# Patient Record
Sex: FEMALE | Race: WHITE | NOT HISPANIC OR LATINO | Employment: STUDENT | URBAN - NONMETROPOLITAN AREA
[De-identification: names, ages, dates, MRNs, and addresses within clinical notes are randomized per-mention and may not be internally consistent; named-entity substitution may affect disease eponyms.]

---
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
## 2024-02-08 ENCOUNTER — OPTICAL OFFICE (OUTPATIENT)
Dept: URBAN - NONMETROPOLITAN AREA CLINIC 4 | Facility: CLINIC | Age: 19
Setting detail: OPHTHALMOLOGY
End: 2024-02-08
Payer: COMMERCIAL

## 2024-02-08 ENCOUNTER — DOCTOR'S OFFICE (OUTPATIENT)
Dept: URBAN - NONMETROPOLITAN AREA CLINIC 1 | Facility: CLINIC | Age: 19
Setting detail: OPHTHALMOLOGY
End: 2024-02-08
Payer: COMMERCIAL

## 2024-02-08 DIAGNOSIS — H52.13: ICD-10-CM

## 2024-02-08 DIAGNOSIS — H52.223: ICD-10-CM

## 2024-02-08 PROCEDURE — 92014 COMPRE OPH EXAM EST PT 1/>: CPT

## 2024-02-08 PROCEDURE — V2784 LENS POLYCARB OR EQUAL: HCPCS

## 2024-02-08 PROCEDURE — 92015 DETERMINE REFRACTIVE STATE: CPT

## 2024-02-08 PROCEDURE — V2784 LENS POLYCARB OR EQUAL: HCPCS | Mod: LT

## 2024-02-08 PROCEDURE — V2020 VISION SVCS FRAMES PURCHASES: HCPCS

## 2024-02-08 PROCEDURE — V2108 SPHEROCYLINDER 4.25D/2.12-4D: HCPCS

## 2024-02-08 PROCEDURE — V2108 SPHEROCYLINDER 4.25D/2.12-4D: HCPCS | Mod: LT

## 2024-02-08 ASSESSMENT — CONFRONTATIONAL VISUAL FIELD TEST (CVF)
OD_FINDINGS: FULL
OS_FINDINGS: FULL

## 2024-02-09 ENCOUNTER — OPTICAL OFFICE (OUTPATIENT)
Dept: URBAN - NONMETROPOLITAN AREA CLINIC 4 | Facility: CLINIC | Age: 19
Setting detail: OPHTHALMOLOGY
End: 2024-02-09
Payer: COMMERCIAL

## 2024-02-09 DIAGNOSIS — H52.13: ICD-10-CM

## 2024-02-09 PROCEDURE — V2108 SPHEROCYLINDER 4.25D/2.12-4D: HCPCS | Mod: LT

## 2024-02-09 PROCEDURE — V2020 VISION SVCS FRAMES PURCHASES: HCPCS

## 2024-02-09 PROCEDURE — V2108 SPHEROCYLINDER 4.25D/2.12-4D: HCPCS

## 2024-02-09 PROCEDURE — V2784 LENS POLYCARB OR EQUAL: HCPCS | Mod: LT

## 2024-02-09 PROCEDURE — V2784 LENS POLYCARB OR EQUAL: HCPCS

## 2024-02-29 ENCOUNTER — TRANSCRIBE ORDERS (OUTPATIENT)
Dept: CARDIOLOGY CLINIC | Facility: CLINIC | Age: 19
End: 2024-02-29

## 2024-02-29 DIAGNOSIS — R30.0 DYSURIA: Primary | ICD-10-CM

## 2024-03-26 PROBLEM — R30.0 DYSURIA: Status: ACTIVE | Noted: 2024-03-26

## 2024-03-27 ENCOUNTER — OFFICE VISIT (OUTPATIENT)
Dept: UROLOGY | Facility: CLINIC | Age: 19
End: 2024-03-27
Payer: COMMERCIAL

## 2024-03-27 VITALS
BODY MASS INDEX: 43.71 KG/M2 | RESPIRATION RATE: 18 BRPM | DIASTOLIC BLOOD PRESSURE: 80 MMHG | TEMPERATURE: 98 F | OXYGEN SATURATION: 99 % | HEIGHT: 66 IN | SYSTOLIC BLOOD PRESSURE: 124 MMHG | HEART RATE: 92 BPM | WEIGHT: 272 LBS

## 2024-03-27 DIAGNOSIS — N39.41 URGE INCONTINENCE OF URINE: Primary | ICD-10-CM

## 2024-03-27 DIAGNOSIS — R30.0 DYSURIA: ICD-10-CM

## 2024-03-27 LAB
POST-VOID RESIDUAL VOLUME, ML POC: 115 ML
SL AMB  POCT GLUCOSE, UA: NORMAL
SL AMB LEUKOCYTE ESTERASE,UA: NORMAL
SL AMB POCT BILIRUBIN,UA: NORMAL
SL AMB POCT BLOOD,UA: NORMAL
SL AMB POCT CLARITY,UA: CLEAR
SL AMB POCT COLOR,UA: YELLOW
SL AMB POCT KETONES,UA: NORMAL
SL AMB POCT NITRITE,UA: NORMAL
SL AMB POCT PH,UA: 6
SL AMB POCT SPECIFIC GRAVITY,UA: >=1.03
SL AMB POCT URINE PROTEIN: NORMAL
SL AMB POCT UROBILINOGEN: 0.2

## 2024-03-27 PROCEDURE — 81003 URINALYSIS AUTO W/O SCOPE: CPT | Performed by: UROLOGY

## 2024-03-27 PROCEDURE — 99204 OFFICE O/P NEW MOD 45 MIN: CPT | Performed by: UROLOGY

## 2024-03-27 PROCEDURE — 51798 US URINE CAPACITY MEASURE: CPT | Performed by: UROLOGY

## 2024-03-27 PROCEDURE — 87086 URINE CULTURE/COLONY COUNT: CPT | Performed by: UROLOGY

## 2024-03-27 RX ORDER — GUANFACINE 1 MG/1
1 TABLET ORAL DAILY
COMMUNITY
Start: 2024-02-05

## 2024-03-27 RX ORDER — DESOGESTREL AND ETHINYL ESTRADIOL 0.15-0.03
1 KIT ORAL DAILY
COMMUNITY
Start: 2024-01-23

## 2024-03-27 RX ORDER — LAMOTRIGINE 200 MG/1
200 TABLET ORAL DAILY
COMMUNITY
Start: 2024-03-18

## 2024-03-27 RX ORDER — OMEPRAZOLE 40 MG/1
40 CAPSULE, DELAYED RELEASE ORAL DAILY
COMMUNITY
Start: 2024-03-10

## 2024-03-27 NOTE — PROGRESS NOTES
UROLOGY PROGRESS NOTE         NAME: Karley Soler  AGE: 18 y.o. SEX: female  : 2005   MRN: [id number]    DATE: 3/27/2024  TIME: 2:06 PM    Assessment and Plan      Impression:   1. Dysuria  -     Ambulatory referral to Urology  -      kidney and bladder; Future; Expected date: 2024  -     POCT urine dip auto non-scope  -     POCT Measure PVR    2. Urge incontinence of urine  -     Ambulatory Referral to Physical Therapy; Future         Plan: Urinalysis was completely normal today.  Her postvoid residual was borderline at 110 cc.  She was also trying to pee and the cup for specimen.  In addition, she notes intermittent problems with urge related incontinence.  She does have problems with constipation and we discussed addressing that issue which will likely help her urinary symptoms.  Will check renal and bladder sonogram.  Await urine culture which was to be sent today.  May have some mild OAB type symptoms regarding frequency and urgency.  If urine culture negative would trial a mild overactive bladder medication.  Also discussed staying hydrated and avoiding irritative foods and drinks such as tea and coffee.  If culture positive treat appropriately.  Consider further evaluation with cystoscopy if persistent symptoms.  I referred her for physical therapy barring any issues with the renal and bladder ultrasound.  Will see her back following physical therapy.      Chief Complaint     Chief Complaint   Patient presents with   • New Patient Visit     Having frequency that has been going on for a year.      History of Present Illness     HPI: Karley Soler is a 18 y.o. year old female who presents with dysuria.  Patient's actually has some occasional urgency and frequency.  She has some feelings of incomplete bladder emptying at times.  She had a urine culture done earlier this year which apparently was negative at the time of symptoms.  Her urinalysis at the time however did suggest a UTI  "with pyuria noted.  She has multiple medical problems including constipation and diabetes              The following portions of the patient's history were reviewed and updated as appropriate: allergies, current medications, past family history, past medical history, past social history, past surgical history and problem list.  Past Medical History:   Diagnosis Date   • ADHD (attention deficit hyperactivity disorder)    • Anxiety    • Autism    • Bipolar 1 disorder (HCC)    • Constipation    • Depression    • Diabetes mellitus (HCC)    • Inflammatory bowel disease    • Obesity    • Seasonal allergies    • Sinus infection      Past Surgical History:   Procedure Laterality Date   • ESOPHAGOGASTRODUODENOSCOPY     • NASAL FRACTURE SURGERY       shoulder  Review of Systems     Const: Denies chills, fever and weight loss.  CV: Denies chest pain.  Resp: Denies SOB.  GI: Denies abdominal pain, nausea and vomiting.  : Denies symptoms other than stated above.  Musculo: Denies back pain.    Objective   /80   Pulse 92   Temp 98 °F (36.7 °C) (Tympanic)   Resp 18   Ht 5' 5.5\" (1.664 m)   Wt 123 kg (272 lb)   SpO2 99%   BMI 44.57 kg/m²     Physical Exam  Const: Appears healthy and well developed. No signs of acute distress present.  Resp: Respirations are regular and unlabored.   CV: Rate is regular. Rhythm is regular.  Abdomen: Abdomen is soft, nontender, and nondistended. Kidneys are not palpable.  : Bladder not percussible or tender.  Postvoid residual done today.  Psych: Patient's attitude is cooperative. Mood is normal. Affect is normal.    Procedure   Procedures     Current Medications     Current Outpatient Medications:   •  Apri 0.15-30 MG-MCG per tablet, Take 1 tablet by mouth daily, Disp: , Rfl:   •  cetirizine (ZyrTEC) 10 mg tablet, Take 10 mg by mouth, Disp: , Rfl:   •  cloNIDine (CATAPRES) 0.1 mg tablet, Take 0.1 mg by mouth once, Disp: , Rfl:   •  guanFACINE (TENEX) 1 mg tablet, Take 1 mg by mouth " daily, Disp: , Rfl:   •  lamoTRIgine (LaMICtal) 200 MG tablet, Take 200 mg by mouth daily, Disp: , Rfl:   •  omeprazole (PriLOSEC) 40 MG capsule, Take 40 mg by mouth daily, Disp: , Rfl:   •  ondansetron (ZOFRAN-ODT) 8 mg disintegrating tablet, Take one tablet allow to dissolve on tongue up to twice daily as needed for nausea/vomiting, Disp: , Rfl:   •  polyethylene glycol (GLYCOLAX) powder, TAKE 17 G BY MOUTH DAILY. MIX WITH WATER., Disp: , Rfl: 11  •  fluticasone (FLONASE) 50 mcg/act nasal spray, 1 spray into each nostril daily for 30 days, Disp: 1 Bottle, Rfl: 0        Min Zhao MD

## 2024-03-29 LAB — BACTERIA UR CULT: NORMAL

## 2024-04-10 ENCOUNTER — TELEPHONE (OUTPATIENT)
Dept: UROLOGY | Facility: CLINIC | Age: 19
End: 2024-04-10

## 2024-04-16 ENCOUNTER — HOSPITAL ENCOUNTER (OUTPATIENT)
Dept: ULTRASOUND IMAGING | Facility: HOSPITAL | Age: 19
Discharge: HOME | End: 2024-04-16
Attending: UROLOGY
Payer: COMMERCIAL

## 2024-04-16 DIAGNOSIS — R30.0 DYSURIA: ICD-10-CM

## 2024-04-16 PROCEDURE — 76770 US EXAM ABDO BACK WALL COMP: CPT

## 2024-08-07 ENCOUNTER — OFFICE VISIT (OUTPATIENT)
Dept: URGENT CARE | Facility: CLINIC | Age: 19
End: 2024-08-07
Payer: COMMERCIAL

## 2024-08-07 ENCOUNTER — APPOINTMENT (OUTPATIENT)
Dept: RADIOLOGY | Facility: CLINIC | Age: 19
End: 2024-08-07
Payer: COMMERCIAL

## 2024-08-07 VITALS
BODY MASS INDEX: 45 KG/M2 | DIASTOLIC BLOOD PRESSURE: 88 MMHG | TEMPERATURE: 98.8 F | HEIGHT: 66 IN | SYSTOLIC BLOOD PRESSURE: 114 MMHG | OXYGEN SATURATION: 99 % | WEIGHT: 280 LBS | HEART RATE: 90 BPM

## 2024-08-07 DIAGNOSIS — S99.922A INJURY OF LEFT FOOT, INITIAL ENCOUNTER: ICD-10-CM

## 2024-08-07 DIAGNOSIS — S90.222A CONTUSION OF LESSER TOE OF LEFT FOOT WITH DAMAGE TO NAIL, INITIAL ENCOUNTER: ICD-10-CM

## 2024-08-07 DIAGNOSIS — S99.922A INJURY OF LEFT FOOT, INITIAL ENCOUNTER: Primary | ICD-10-CM

## 2024-08-07 PROCEDURE — S9088 SERVICES PROVIDED IN URGENT: HCPCS | Performed by: PHYSICIAN ASSISTANT

## 2024-08-07 PROCEDURE — 73630 X-RAY EXAM OF FOOT: CPT

## 2024-08-07 PROCEDURE — 99213 OFFICE O/P EST LOW 20 MIN: CPT | Performed by: PHYSICIAN ASSISTANT

## 2024-08-07 RX ORDER — MELOXICAM 15 MG/1
15 TABLET ORAL DAILY
Qty: 10 TABLET | Refills: 0 | Status: SHIPPED | OUTPATIENT
Start: 2024-08-07 | End: 2024-08-17

## 2024-08-07 RX ORDER — TRAZODONE HYDROCHLORIDE 50 MG/1
TABLET ORAL
COMMUNITY
Start: 2024-07-08

## 2024-08-07 NOTE — PROGRESS NOTES
[facility]        NAME: Karley Soler is a 18 y.o. female  : 2005    MRN: [id number]  DATE: 2024  TIME: 2:33 PM    Assessment and Plan   Injury of left foot, initial encounter [S99.922A]  1. Injury of left foot, initial encounter  XR foot 3+ vw left      2. Contusion of lesser toe of left foot with damage to nail, initial encounter  meloxicam (Mobic) 15 mg tablet            Patient Instructions   There are no Patient Instructions on file for this visit.      Follow up with PCP in 3-5 days.  Proceed to  ER if symptoms worsen.    Chief Complaint     Chief Complaint   Patient presents with    left foot pain     Pt states that she has left foot pain x7 days due to dropping cell phone on top of foot. Pt taking OTC mediations without relief.         History of Present Illness       The patient presents to clinic for injury to her left foot.  She states that the injury happened approximately 1 week ago.  She states that she dropped a phone on her foot 1 week ago.  She is complaining of pain on the lateral aspect of the left fifth toe.  She is taking ibuprofen which does not seem to be helping with her symptoms.        Review of Systems   Review of Systems   Skin:  Negative for color change, pallor, rash and wound.   Neurological:  Negative for dizziness.         Current Medications       Current Outpatient Medications:     Apri 0.15-30 MG-MCG per tablet, Take 1 tablet by mouth daily, Disp: , Rfl:     cetirizine (ZyrTEC) 10 mg tablet, Take 10 mg by mouth, Disp: , Rfl:     meloxicam (Mobic) 15 mg tablet, Take 1 tablet (15 mg total) by mouth daily for 10 days, Disp: 10 tablet, Rfl: 0    omeprazole (PriLOSEC) 40 MG capsule, Take 40 mg by mouth daily, Disp: , Rfl:     ondansetron (ZOFRAN-ODT) 8 mg disintegrating tablet, Take one tablet allow to dissolve on tongue up to twice daily as needed for nausea/vomiting, Disp: , Rfl:     traZODone (DESYREL) 50 mg tablet, TAKE 1-2 TABLETS BY MOUTH AT  "BEDTIME AS NEEDED FOR SLEEP, Disp: , Rfl:     fluticasone (FLONASE) 50 mcg/act nasal spray, 1 spray into each nostril daily for 30 days, Disp: 1 Bottle, Rfl: 0    Current Allergies     Allergies as of 08/07/2024 - Reviewed 08/07/2024   Allergen Reaction Noted    Duloxetine hcl Other (See Comments) 03/20/2020    Sertraline Other (See Comments) 03/20/2020    Lactose intolerance (gi) - food allergy Diarrhea 03/12/2016    Fluoxetine Other (See Comments) 05/15/2020    Gluten meal - food allergy  01/31/2018            The following portions of the patient's history were reviewed and updated as appropriate: allergies, current medications, past family history, past medical history, past social history, past surgical history and problem list.     Past Medical History:   Diagnosis Date    ADHD (attention deficit hyperactivity disorder)     Anxiety     Autism     Bipolar 1 disorder (HCC)     Constipation     Depression     Diabetes mellitus (HCC)     Inflammatory bowel disease     Obesity     Seasonal allergies     Sinus infection        Past Surgical History:   Procedure Laterality Date    ESOPHAGOGASTRODUODENOSCOPY      NASAL FRACTURE SURGERY         Family History   Problem Relation Age of Onset    Anxiety disorder Mother     No Known Problems Father          Medications have been verified.        Objective   /88   Pulse 90   Temp 98.8 °F (37.1 °C)   Ht 5' 5.5\" (1.664 m)   Wt 127 kg (280 lb)   SpO2 99%   BMI 45.89 kg/m²        Physical Exam     Physical Exam  Musculoskeletal:        Feet:       Comments: The patient has tenderness to palpation noted in the left lateral fifth metatarsal bone.  There is no significant ecchymosis or edema.         XR FOOT 3+ VW LEFT     INDICATION: S99.922A: Unspecified injury of left foot, initial encounter.     COMPARISON: 5/23/2015     FINDINGS:     No acute fracture or dislocation.     No significant degenerative changes.     No lytic or blastic osseous lesion.     Unremarkable " soft tissues.     IMPRESSION:     No acute osseous abnormality.        I reviewed the x-ray with the patient.  There is no acute fracture.  She was given a DME surgical shoe for comfort.  I  if her Mobic for her pain relief.  I suggest follow-up with PCP or go to the ER if symptoms worsen

## 2024-09-04 ENCOUNTER — OFFICE VISIT (OUTPATIENT)
Dept: URGENT CARE | Facility: CLINIC | Age: 19
End: 2024-09-04
Payer: COMMERCIAL

## 2024-09-04 VITALS
WEIGHT: 280 LBS | HEIGHT: 65 IN | HEART RATE: 95 BPM | RESPIRATION RATE: 18 BRPM | DIASTOLIC BLOOD PRESSURE: 78 MMHG | OXYGEN SATURATION: 98 % | TEMPERATURE: 98.2 F | BODY MASS INDEX: 46.65 KG/M2 | SYSTOLIC BLOOD PRESSURE: 106 MMHG

## 2024-09-04 DIAGNOSIS — T78.40XA ALLERGIC REACTION, INITIAL ENCOUNTER: Primary | ICD-10-CM

## 2024-09-04 PROCEDURE — 99213 OFFICE O/P EST LOW 20 MIN: CPT

## 2024-09-04 PROCEDURE — S9088 SERVICES PROVIDED IN URGENT: HCPCS

## 2024-09-04 RX ORDER — DEXTROMETHORPHAN HYDROBROMIDE, BUPROPION HYDROCHLORIDE 105; 45 MG/1; MG/1
TABLET, MULTILAYER, EXTENDED RELEASE ORAL
COMMUNITY
Start: 2024-08-22

## 2024-09-04 RX ORDER — LANOLIN ALCOHOL/MO/W.PET/CERES
400 CREAM (GRAM) TOPICAL DAILY
COMMUNITY
Start: 2024-08-07

## 2024-09-04 RX ORDER — TRIAMCINOLONE ACETONIDE 1 MG/G
CREAM TOPICAL 2 TIMES DAILY
Qty: 45 G | Refills: 1 | Status: SHIPPED | OUTPATIENT
Start: 2024-09-04

## 2024-09-04 RX ORDER — PREDNISONE 10 MG/1
TABLET ORAL
Qty: 42 TABLET | Refills: 0 | Status: SHIPPED | OUTPATIENT
Start: 2024-09-04

## 2024-09-04 NOTE — PATIENT INSTRUCTIONS
Take prednisone as prescribed - follow taper instructions  Use Kenalog cream as prescribed    Avoid scratching area  Cool compresses  Allegra over the counter for daytime itchiness  Oral benadryl for itching as needed at night  Keep area clean and dry  Watch for signs of infection    Follow up with allergist for further testing.     Follow up with PCP in 3-5 days.  Proceed to  ER if symptoms worsen.    If tests are performed, our office will contact you with results only if changes need to made to the care plan discussed with you at the visit. You can review your full results on [facility].

## 2024-09-04 NOTE — PROGRESS NOTES
[facility]        NAME: Karley Soler is a 18 y.o. female  : 2005    MRN: [id number]  DATE: 2024  TIME: 9:13 AM    Assessment and Plan   Allergic reaction, initial encounter [T78.40XA]  1. Allergic reaction, initial encounter  predniSONE 10 mg tablet    triamcinolone (KENALOG) 0.1 % cream    Ambulatory Referral to Allergy            Patient Instructions     Take prednisone as prescribed - follow taper instructions  Use Kenalog cream as prescribed    Avoid scratching area  Cool compresses  Allegra over the counter for daytime itchiness  Oral benadryl for itching as needed at night  Keep area clean and dry  Watch for signs of infection    Follow up with allergist for further testing.     Follow up with PCP in 3-5 days.  Proceed to  ER if symptoms worsen.    If tests are performed, our office will contact you with results only if changes need to made to the care plan discussed with you at the visit. You can review your full results on [facility].    Chief Complaint     Chief Complaint   Patient presents with    Rash     Red pinpoint rash on left wrist and ankles, Pt states its itchy.X 2 days  No home remedies tryed         History of Present Illness       18-year-old female arrives reporting she ate and everything bagel for the first time 2 days ago and immediately had tingling around her lips and tongue.  Patient reports she had never tried this type of food before and believes that the  reaction was related to the everything bagel.  Patient denies any current chest pain, tightness in chest, shortness of breath or abdominal pain.  Patient denies any fevers.  Patient reports a rash that is hives-like in appearance and diffuse in all extremities that is itchy.    Rash  Pertinent negatives include no cough, fatigue, fever or shortness of breath.       Review of Systems   Review of Systems   Constitutional:  Negative for chills, diaphoresis, fatigue and fever.   Respiratory:   Negative for cough, chest tightness and shortness of breath.    Cardiovascular:  Negative for chest pain.   Gastrointestinal:  Negative for abdominal pain.   Musculoskeletal:  Negative for myalgias.   Skin:  Positive for rash (diffuse rash to extremities).         Current Medications       Current Outpatient Medications:     Apri 0.15-30 MG-MCG per tablet, Take 1 tablet by mouth daily, Disp: , Rfl:     cetirizine (ZyrTEC) 10 mg tablet, Take 10 mg by mouth, Disp: , Rfl:     magnesium Oxide (MAG-OX) 400 mg TABS, Take 400 mg by mouth daily, Disp: , Rfl:     omeprazole (PriLOSEC) 40 MG capsule, Take 40 mg by mouth daily, Disp: , Rfl:     predniSONE 10 mg tablet, Take 60 mg on day one and two, Take 50 mg on day 3 - 4, Take 40 mg on day 5-6, Take 30 mg on day 7-8, Take 20 mg on day 8-9, Take 10 mg on day 10-11, Disp: 42 tablet, Rfl: 0    traZODone (DESYREL) 50 mg tablet, TAKE 1-2 TABLETS BY MOUTH AT BEDTIME AS NEEDED FOR SLEEP, Disp: , Rfl:     triamcinolone (KENALOG) 0.1 % cream, Apply topically 2 (two) times a day, Disp: 45 g, Rfl: 1    Auvelity  MG TBCR, TAKE ONE TABLET BY MOUTH DAILY FOR 3 DAYS. THEN INCREASE TO ONE TABLET BY MOUTH TWICE DAILY (Patient not taking: Reported on 9/4/2024), Disp: , Rfl:     fluticasone (FLONASE) 50 mcg/act nasal spray, 1 spray into each nostril daily for 30 days, Disp: 1 Bottle, Rfl: 0    meloxicam (Mobic) 15 mg tablet, Take 1 tablet (15 mg total) by mouth daily for 10 days, Disp: 10 tablet, Rfl: 0    ondansetron (ZOFRAN-ODT) 8 mg disintegrating tablet, Take one tablet allow to dissolve on tongue up to twice daily as needed for nausea/vomiting (Patient not taking: Reported on 9/4/2024), Disp: , Rfl:     Current Allergies     Allergies as of 09/04/2024 - Reviewed 09/04/2024   Allergen Reaction Noted    Duloxetine hcl Other (See Comments) 03/20/2020    Sertraline Other (See Comments) 03/20/2020    Lactose intolerance (gi) - food allergy Diarrhea 03/12/2016    Fluoxetine Other (See  "Comments) 05/15/2020    Gluten meal - food allergy  01/31/2018    Other Hives 09/04/2024            The following portions of the patient's history were reviewed and updated as appropriate: allergies, current medications, past family history, past medical history, past social history, past surgical history and problem list.     Past Medical History:   Diagnosis Date    ADHD (attention deficit hyperactivity disorder)     Anxiety     Autism     Bipolar 1 disorder (HCC)     Constipation     Depression     Inflammatory bowel disease     Obesity     Seasonal allergies     Sinus infection        Past Surgical History:   Procedure Laterality Date    COMBINED REDUCTION MAMMAPLASTY W/ LIPOSUCTION      ESOPHAGOGASTRODUODENOSCOPY      NASAL FRACTURE SURGERY         Family History   Problem Relation Age of Onset    Anxiety disorder Mother     No Known Problems Father          Medications have been verified.        Objective   /78   Pulse 95   Temp 98.2 °F (36.8 °C)   Resp 18   Ht 5' 5\" (1.651 m)   Wt 127 kg (280 lb)   LMP  (LMP Unknown) Comment: Pt states she doesnt get period while on birth control  SpO2 98%   BMI 46.59 kg/m²        Physical Exam     Physical Exam  Vitals and nursing note reviewed.   Constitutional:       General: She is not in acute distress.     Appearance: Normal appearance. She is not ill-appearing.   HENT:      Head: Normocephalic.      Right Ear: External ear normal.      Left Ear: External ear normal.      Nose: Nose normal.   Eyes:      Pupils: Pupils are equal, round, and reactive to light.   Cardiovascular:      Rate and Rhythm: Normal rate and regular rhythm.      Pulses: Normal pulses.      Heart sounds: Normal heart sounds.   Pulmonary:      Effort: Pulmonary effort is normal. No respiratory distress.      Breath sounds: Normal breath sounds. No stridor. No wheezing, rhonchi or rales.   Chest:      Chest wall: No tenderness.   Musculoskeletal:         General: Normal range of motion. "      Cervical back: Normal range of motion and neck supple.   Lymphadenopathy:      Cervical: No cervical adenopathy.   Skin:     General: Skin is warm and dry.      Capillary Refill: Capillary refill takes less than 2 seconds.      Findings: Rash present. Rash is papular and urticarial.   Neurological:      General: No focal deficit present.      Mental Status: She is alert and oriented to person, place, and time.   Psychiatric:         Mood and Affect: Mood normal.         Behavior: Behavior normal.

## 2024-09-04 NOTE — LETTER
September 4, 2024     Patient: Karley Soler   YOB: 2005   Date of Visit: 9/4/2024       To Whom it May Concern:    Karley Soler was seen in my clinic on 9/4/2024. She may return to work on 09/05/2024 .    If you have any questions or concerns, please don't hesitate to call.         Sincerely,          ROBER Morales        CC: No Recipients

## 2024-10-02 ENCOUNTER — APPOINTMENT (OUTPATIENT)
Dept: RADIOLOGY | Facility: CLINIC | Age: 19
End: 2024-10-02
Payer: COMMERCIAL

## 2024-10-02 ENCOUNTER — OFFICE VISIT (OUTPATIENT)
Dept: URGENT CARE | Facility: CLINIC | Age: 19
End: 2024-10-02
Payer: COMMERCIAL

## 2024-10-02 VITALS
RESPIRATION RATE: 16 BRPM | DIASTOLIC BLOOD PRESSURE: 72 MMHG | OXYGEN SATURATION: 99 % | TEMPERATURE: 98.1 F | SYSTOLIC BLOOD PRESSURE: 124 MMHG | BODY MASS INDEX: 48.82 KG/M2 | HEART RATE: 108 BPM | HEIGHT: 65 IN | WEIGHT: 293 LBS
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
## 2024-10-02 DIAGNOSIS — S83.92XA SPRAIN OF LEFT KNEE, UNSPECIFIED LIGAMENT, INITIAL ENCOUNTER: Primary | ICD-10-CM

## 2024-10-02 PROCEDURE — S9088 SERVICES PROVIDED IN URGENT: HCPCS

## 2024-10-02 PROCEDURE — 73610 X-RAY EXAM OF ANKLE: CPT

## 2024-10-02 PROCEDURE — 73564 X-RAY EXAM KNEE 4 OR MORE: CPT

## 2024-10-02 PROCEDURE — 99213 OFFICE O/P EST LOW 20 MIN: CPT

## 2024-10-02 NOTE — PROGRESS NOTES
[facility]        NAME: Karley Soler is a 18 y.o. female  : 2005    MRN: [id number]  DATE: 2024  TIME: 5:53 PM    Assessment and Plan   Sprain of left knee, unspecified ligament, initial encounter [S83.92XA]  1. Sprain of left knee, unspecified ligament, initial encounter  Ambulatory Referral to Orthopedic Surgery      2. Fall, initial encounter  XR ankle 3+ vw right    XR knee 4+ vw left injury        Preliminary XR reads negative for acute osseous abnormalities, final reads pending. Encouraged continued supportive measures.  Ace wrap applied by Naida Weiss RN. RICE therapy. OTC Tylenol/Ibuprofen for pain. Referral placed to Orthopedic Surgery. Follow up with PCP in 3-5 days or proceed to emergency department for worsening symptoms.  Patient verbalized understanding of instructions given.       Patient Instructions     Patient Instructions   Preliminary XR read negative, final read pending  Rest, Ice, Compression, and Elevation  OTC Tylenol/Ibuprofen for pain  Referral placed to Orthopedic Surgery, if needed   Follow up with PCP in 3-5 days.  Proceed to  ER if symptoms worsen.    If tests have been performed at [facility], our office will contact you with results if changes need to be made to the care plan discussed with you at the visit.  You can review your full results on [facility]'s MyChart.      Patient Education     Knee sprain   The Basics   Written by the doctors and editors at [facility]   What causes a knee sprain? -- A knee sprain happens the knee is bent or twisted too far in 1 direction.  Inside the knee are tough bands of tissue called ligaments. These hold the different bones together (figure 1). During a sprain, 1 or more of those ligaments stretch too far or even tear. This can cause pain and swelling and might make the knee feel unsteady.  What are the symptoms of a knee sprain? -- The symptoms can include pain, tenderness, swelling, and bruising of the knee.  Some  "people with a knee sprain also find it hard to bend the knee or walk. Others might feel like their knee is unstable or \"gives out\" when they go up or down stairs. Also, some people cannot put weight on the leg with the injured knee.  Is there a test for a knee sprain? -- A doctor or nurse might be able to tell if you have a sprain by doing an exam and learning about what happened to your knee. They might bend and straighten your leg to see what hurts and check how loose your knee feels.  In some cases, a doctor might order an X-ray to check for broken bones. But this is not always needed. Some doctors might use an ultrasound to look at the ligaments. Ultrasound is an imaging test that creates pictures of the inside of the body. Later, you might need to have other tests like an MRI.  How is a knee sprain treated? -- Ask the doctor or nurse what you should do when you go home. Make sure that you understand exactly what you need to do to care for yourself. Ask questions if there is anything you do not understand.  You should also do the following. Think of the word \"PRICE\":   Protect - To protect your knee, the doctor might order a knee brace or splint for you. An elastic bandage can also keep your knee from moving too much. Wear your knee brace or splint as your doctor tells you to.   Rest - To rest your knee, use crutches and stay off of your feet. You might have to limit your activities and how much you walk or stand. This will help your knee rest while it heals.   Ice - Apply a cold gel pack, bag of ice, or bag of frozen vegetables on your knee every 1 to 2 hours, for 15 minutes each time. Put a thin towel between the ice (or other cold object) and your skin. Use the ice (or other cold object) for at least 6 hours after your injury. Some people find it helpful to ice longer, even up to 2 days after their injury.   Compression - \"Compression\" basically means pressure. You want to have your knee under slight pressure " "by having it wrapped in an elastic \"compression\" bandage. This helps reduce swelling and supports the knee. Your doctor or nurse will show you how to wrap your knee. Do not wrap it too tight or you might cut off the blood flow to your foot.   Elevation - \"Elevation\" means keeping your knee raised up above the level of your heart. To do this, you can put your leg on some pillows or blankets while you are lying down, or on a table or chair while you are sitting.  You can also take medicines to relieve pain, such as acetaminophen (sample brand name: Tylenol), ibuprofen (sample brand names: Advil, Motrin), or naproxen (sample brand name: Aleve).  After a few days, when you have less swelling and pain, you can start to gently stretch your knee. You can also start to do gentle activities again.  Your doctor or nurse might also give you exercises to do as your knee heals. They might also recommend working with a physical therapist (exercise expert).  What follow-up care do I need? -- Your doctor or nurse will tell you if you need to make a follow-up appointment. If so, make sure that you know when and where to go. If the injury is not healing as expected, your doctor might order an X-ray to check for a broken bone.  When should I call the doctor? -- Call for advice if:   Your pain or swelling is getting worse.   Your foot or toes are blue or gray, and numb.   You are unable to put weight on your knee, your knee \"locks\" in place, or your knee \"gives out.\"  All topics are updated as new evidence becomes available and our peer review process is complete.  This topic retrieved from [employer] on: Mar 13, 2024.  Topic 537012 Version 2.0  Release: 32.2.4 - C32.71  © 2024 UpToDate, Inc. and/or its affiliates. All rights reserved.  figure 1: Front view of the knee     This drawing shows the inner parts of the knee as seen from the front. A small bone (called the patella or the \"knee cap\") that sits in front of the knee has been " "removed so that you can see what is under that bone. The anterior cruciate ligament (ACL) is in the middle in white. It connects the thigh bone (called the \"femur\") to the shin bone (called the \"tibia\"). The meniscus is a cushion of rubbery material (cartilage) between the thigh bone and the shin bone.  Graphic 37636 Version 5.0  Consumer Information Use and Disclaimer   Disclaimer: This generalized information is a limited summary of diagnosis, treatment, and/or medication information. It is not meant to be comprehensive and should be used as a tool to help the user understand and/or assess potential diagnostic and treatment options. It does NOT include all information about conditions, treatments, medications, side effects, or risks that may apply to a specific patient. It is not intended to be medical advice or a substitute for the medical advice, diagnosis, or treatment of a health care provider based on the health care provider's examination and assessment of a patient's specific and unique circumstances. Patients must speak with a health care provider for complete information about their health, medical questions, and treatment options, including any risks or benefits regarding use of medications. This information does not endorse any treatments or medications as safe, effective, or approved for treating a specific patient. UpToDate, Inc. and its affiliates disclaim any warranty or liability relating to this information or the use thereof.The use of this information is governed by the Terms of Use, available at https://www.woltersThe Bakeryuwer.com/en/know/clinical-effectiveness-terms. 2024© UpToDate, Inc. and its affiliates and/or licensors. All rights reserved.  Copyright   © 2024 UpToDate, Inc. and/or its affiliates. All rights reserved.        Chief Complaint     Chief Complaint   Patient presents with    Ankle Injury     Was walking on an incline and stepped into a  hole in the cement . Right ankle rolled and " twistted.         History of Present Illness       18-year-old female with a past medical history significant for ADHD, bipolar 1 disorder, and autism presents following mechanical fall.  Patient reports ambulating and stepping into a hole and twisting right ankle.  She reports this then caused her to fall and land on left knee.  Denies bruising or swelling.  Able to ambulate and bear weight but with some difficulty as main complaint is knee pain.  She did take OTC ibuprofen prior to arrival. Prior history of knee fracture per patient.     Ankle Injury   Pertinent negatives include no numbness.       Review of Systems   Review of Systems   Constitutional:  Negative for chills and fever.   Respiratory:  Negative for cough and shortness of breath.    Cardiovascular:  Negative for chest pain.   Gastrointestinal:  Negative for abdominal pain, diarrhea, nausea and vomiting.   Musculoskeletal:  Positive for arthralgias. Negative for gait problem and joint swelling.   Skin:  Negative for color change and rash.   Neurological:  Negative for weakness and numbness.         Current Medications       Current Outpatient Medications:     Apri 0.15-30 MG-MCG per tablet, Take 1 tablet by mouth daily, Disp: , Rfl:     cetirizine (ZyrTEC) 10 mg tablet, Take 10 mg by mouth, Disp: , Rfl:     magnesium Oxide (MAG-OX) 400 mg TABS, Take 400 mg by mouth daily, Disp: , Rfl:     omeprazole (PriLOSEC) 40 MG capsule, Take 40 mg by mouth daily, Disp: , Rfl:     traZODone (DESYREL) 50 mg tablet, TAKE 1-2 TABLETS BY MOUTH AT BEDTIME AS NEEDED FOR SLEEP, Disp: , Rfl:     triamcinolone (KENALOG) 0.1 % cream, Apply topically 2 (two) times a day, Disp: 45 g, Rfl: 1    Auvelity  MG TBCR, TAKE ONE TABLET BY MOUTH DAILY FOR 3 DAYS. THEN INCREASE TO ONE TABLET BY MOUTH TWICE DAILY (Patient not taking: Reported on 9/4/2024), Disp: , Rfl:     ondansetron (ZOFRAN-ODT) 8 mg disintegrating tablet, Take one tablet allow to dissolve on tongue up to twice  "daily as needed for nausea/vomiting (Patient not taking: Reported on 9/4/2024), Disp: , Rfl:     Current Allergies     Allergies as of 10/02/2024 - Reviewed 10/02/2024   Allergen Reaction Noted    Duloxetine hcl Other (See Comments) 03/20/2020    Sertraline Other (See Comments) 03/20/2020    Lactose intolerance (gi) - food allergy Diarrhea 03/12/2016    Fluoxetine Other (See Comments) 05/15/2020    Gluten meal - food allergy  01/31/2018    Other Hives 09/04/2024            The following portions of the patient's history were reviewed and updated as appropriate: allergies, current medications, past family history, past medical history, past social history, past surgical history and problem list.     Past Medical History:   Diagnosis Date    ADHD (attention deficit hyperactivity disorder)     Anxiety     Autism     Bipolar 1 disorder (HCC)     Constipation     Depression     Inflammatory bowel disease     Obesity     Seasonal allergies     Sinus infection        Past Surgical History:   Procedure Laterality Date    COMBINED REDUCTION MAMMAPLASTY W/ LIPOSUCTION      ESOPHAGOGASTRODUODENOSCOPY      NASAL FRACTURE SURGERY         Family History   Problem Relation Age of Onset    Anxiety disorder Mother     No Known Problems Father          Medications have been verified.        Objective   /72   Pulse (!) 108   Temp 98.1 °F (36.7 °C)   Resp 16   Ht 5' 5\" (1.651 m)   Wt 136 kg (299 lb)   LMP  (LMP Unknown) Comment: Pt states she doesnt get period while on birth control  SpO2 99%   BMI 49.76 kg/m²   No LMP recorded (lmp unknown). (Menstrual status: Amenorrheic other).       Physical Exam     Physical Exam  Vitals and nursing note reviewed.   Constitutional:       General: She is not in acute distress.     Appearance: She is not toxic-appearing.   HENT:      Head: Normocephalic.   Eyes:      Conjunctiva/sclera: Conjunctivae normal.   Pulmonary:      Effort: Pulmonary effort is normal.   Musculoskeletal:        "  General: Tenderness present. No swelling.      Left knee: Bony tenderness present. No swelling. Decreased range of motion. Tenderness present.      Right ankle: No swelling. No tenderness. Normal range of motion.      Right foot: Normal.        Legs:    Skin:     General: Skin is warm and dry.   Neurological:      Mental Status: She is alert and oriented to person, place, and time.      Sensory: Sensation is intact.      Motor: Motor function is intact.      Gait: Gait is intact.   Psychiatric:         Mood and Affect: Mood normal.         Behavior: Behavior normal.

## 2024-10-02 NOTE — PATIENT INSTRUCTIONS
"Preliminary XR read negative, final read pending  Rest, Ice, Compression, and Elevation  OTC Tylenol/Ibuprofen for pain  Referral placed to Orthopedic Surgery, if needed   Follow up with PCP in 3-5 days.  Proceed to  ER if symptoms worsen.    If tests have been performed at [facility], our office will contact you with results if changes need to be made to the care plan discussed with you at the visit.  You can review your full results on [facility] MyChart.      Patient Education     Knee sprain   The Basics   Written by the doctors and editors at [facility]   What causes a knee sprain? -- A knee sprain happens the knee is bent or twisted too far in 1 direction.  Inside the knee are tough bands of tissue called ligaments. These hold the different bones together (figure 1). During a sprain, 1 or more of those ligaments stretch too far or even tear. This can cause pain and swelling and might make the knee feel unsteady.  What are the symptoms of a knee sprain? -- The symptoms can include pain, tenderness, swelling, and bruising of the knee.  Some people with a knee sprain also find it hard to bend the knee or walk. Others might feel like their knee is unstable or \"gives out\" when they go up or down stairs. Also, some people cannot put weight on the leg with the injured knee.  Is there a test for a knee sprain? -- A doctor or nurse might be able to tell if you have a sprain by doing an exam and learning about what happened to your knee. They might bend and straighten your leg to see what hurts and check how loose your knee feels.  In some cases, a doctor might order an X-ray to check for broken bones. But this is not always needed. Some doctors might use an ultrasound to look at the ligaments. Ultrasound is an imaging test that creates pictures of the inside of the body. Later, you might need to have other tests like an MRI.  How is a knee sprain treated? -- Ask the doctor or nurse what you should do when you go home. Make " "sure that you understand exactly what you need to do to care for yourself. Ask questions if there is anything you do not understand.  You should also do the following. Think of the word \"PRICE\":   Protect - To protect your knee, the doctor might order a knee brace or splint for you. An elastic bandage can also keep your knee from moving too much. Wear your knee brace or splint as your doctor tells you to.   Rest - To rest your knee, use crutches and stay off of your feet. You might have to limit your activities and how much you walk or stand. This will help your knee rest while it heals.   Ice - Apply a cold gel pack, bag of ice, or bag of frozen vegetables on your knee every 1 to 2 hours, for 15 minutes each time. Put a thin towel between the ice (or other cold object) and your skin. Use the ice (or other cold object) for at least 6 hours after your injury. Some people find it helpful to ice longer, even up to 2 days after their injury.   Compression - \"Compression\" basically means pressure. You want to have your knee under slight pressure by having it wrapped in an elastic \"compression\" bandage. This helps reduce swelling and supports the knee. Your doctor or nurse will show you how to wrap your knee. Do not wrap it too tight or you might cut off the blood flow to your foot.   Elevation - \"Elevation\" means keeping your knee raised up above the level of your heart. To do this, you can put your leg on some pillows or blankets while you are lying down, or on a table or chair while you are sitting.  You can also take medicines to relieve pain, such as acetaminophen (sample brand name: Tylenol), ibuprofen (sample brand names: Advil, Motrin), or naproxen (sample brand name: Aleve).  After a few days, when you have less swelling and pain, you can start to gently stretch your knee. You can also start to do gentle activities again.  Your doctor or nurse might also give you exercises to do as your knee heals. They might " "also recommend working with a physical therapist (exercise expert).  What follow-up care do I need? -- Your doctor or nurse will tell you if you need to make a follow-up appointment. If so, make sure that you know when and where to go. If the injury is not healing as expected, your doctor might order an X-ray to check for a broken bone.  When should I call the doctor? -- Call for advice if:   Your pain or swelling is getting worse.   Your foot or toes are blue or gray, and numb.   You are unable to put weight on your knee, your knee \"locks\" in place, or your knee \"gives out.\"  All topics are updated as new evidence becomes available and our peer review process is complete.  This topic retrieved from [employer] on: Mar 13, 2024.  Topic 528353 Version 2.0  Release: 32.2.4 - C32.71  © 2024 UpToDate, Inc. and/or its affiliates. All rights reserved.  figure 1: Front view of the knee     This drawing shows the inner parts of the knee as seen from the front. A small bone (called the patella or the \"knee cap\") that sits in front of the knee has been removed so that you can see what is under that bone. The anterior cruciate ligament (ACL) is in the middle in white. It connects the thigh bone (called the \"femur\") to the shin bone (called the \"tibia\"). The meniscus is a cushion of rubbery material (cartilage) between the thigh bone and the shin bone.  Graphic 51909 Version 5.0  Consumer Information Use and Disclaimer   Disclaimer: This generalized information is a limited summary of diagnosis, treatment, and/or medication information. It is not meant to be comprehensive and should be used as a tool to help the user understand and/or assess potential diagnostic and treatment options. It does NOT include all information about conditions, treatments, medications, side effects, or risks that may apply to a specific patient. It is not intended to be medical advice or a substitute for the medical advice, diagnosis, or treatment of a " health care provider based on the health care provider's examination and assessment of a patient's specific and unique circumstances. Patients must speak with a health care provider for complete information about their health, medical questions, and treatment options, including any risks or benefits regarding use of medications. This information does not endorse any treatments or medications as safe, effective, or approved for treating a specific patient. UpToDate, Inc. and its affiliates disclaim any warranty or liability relating to this information or the use thereof.The use of this information is governed by the Terms of Use, available at https://www.[employer].com/en/know/clinical-effectiveness-terms. 2024© UpToDate, Inc. and its affiliates and/or licensors. All rights reserved.  Copyright   © 2024 UpToDate, Inc. and/or its affiliates. All rights reserved.

## 2024-12-03 ENCOUNTER — OFFICE VISIT (OUTPATIENT)
Dept: URGENT CARE | Facility: CLINIC | Age: 19
End: 2024-12-03
Payer: COMMERCIAL

## 2024-12-03 VITALS
HEART RATE: 98 BPM | RESPIRATION RATE: 18 BRPM | SYSTOLIC BLOOD PRESSURE: 110 MMHG | BODY MASS INDEX: 48.82 KG/M2 | OXYGEN SATURATION: 98 % | HEIGHT: 65 IN | WEIGHT: 293 LBS | DIASTOLIC BLOOD PRESSURE: 68 MMHG | TEMPERATURE: 98.6 F

## 2024-12-03 DIAGNOSIS — Z87.09 HISTORY OF ASTHMA: ICD-10-CM

## 2024-12-03 DIAGNOSIS — K29.70 GASTRITIS WITHOUT BLEEDING, UNSPECIFIED CHRONICITY, UNSPECIFIED GASTRITIS TYPE: Primary | ICD-10-CM

## 2024-12-03 PROCEDURE — 99213 OFFICE O/P EST LOW 20 MIN: CPT

## 2024-12-03 PROCEDURE — S9088 SERVICES PROVIDED IN URGENT: HCPCS

## 2024-12-03 RX ORDER — ALBUTEROL SULFATE 90 UG/1
2 INHALANT RESPIRATORY (INHALATION) EVERY 6 HOURS PRN
Qty: 8.5 G | Refills: 0 | Status: SHIPPED | OUTPATIENT
Start: 2024-12-03

## 2024-12-03 RX ORDER — DICYCLOMINE HYDROCHLORIDE 10 MG/1
10 CAPSULE ORAL 3 TIMES DAILY PRN
Qty: 10 CAPSULE | Refills: 0 | Status: SHIPPED | OUTPATIENT
Start: 2024-12-03

## 2024-12-03 RX ORDER — TOPIRAMATE 25 MG/1
TABLET, FILM COATED ORAL
COMMUNITY
Start: 2024-10-01

## 2024-12-03 NOTE — LETTER
December 3, 2024     Patient: Karley Soler   YOB: 2005   Date of Visit: 12/3/2024       To Whom it May Concern:    Karley Soler was seen in my clinic on 12/3/2024. She may return to work on 12/4/2024 provided her GI symptoms have improved and she is fever free x24 hours without fever reducing medicines .    If you have any questions or concerns, please don't hesitate to call.         Sincerely,          ROBER Hernández        CC: No Recipients

## 2024-12-03 NOTE — PROGRESS NOTES
"  [facility]        NAME: Karley Soler is a 18 y.o. female  : 2005    MRN: [id number]  DATE: December 3, 2024  TIME: 11:52 AM    Assessment and Plan   Gastritis without bleeding, unspecified chronicity, unspecified gastritis type [K29.70]  1. Gastritis without bleeding, unspecified chronicity, unspecified gastritis type  dicyclomine (BENTYL) 10 mg capsule    Ambulatory Referral to Gastroenterology      2. History of asthma  albuterol (ProAir HFA) 90 mcg/act inhaler            Patient Instructions       Follow up with PCP in 3-5 days.  Proceed to  ER if symptoms worsen.    If tests are performed, our office will contact you with results only if changes need to made to the care plan discussed with you at the visit. You can review your full results on [facility].    Chief Complaint     Chief Complaint   Patient presents with    Diarrhea     Started 1 day ago  Nausea, and diarrhea X 15  No OTC medication  Request note for work  Seen by PCP 2 weeks ago for similar symptoms, diagnosed with viral         History of Present Illness       Patient here with onset yesterday with nausea and \"sulfur burps\". She has also had diarrhea ongoing since yesterday. She has felt bloated and reports she had abdominal distension. She has had chills, she has not measured her temperature. She was seen  by PCP and was Dx with viral gastritis. At that time she felt maybe it was related to food at that time. Those symptoms lasted about 4 days. She has been eating, she was \"stuffing stuff down to try and throw up\". She has only had the nausea and has not had any active vomiting this episode. She does take omeprazole for ongoing GI upset symptoms. Current diarrhea has been minimal form to it and bright yellow. She has seen GI in the past. Was told she has IBS after endoscopy at age 3 which was repeated around age 13. She was also told she has lactose and celiac at age 3 but then upon repeat she wa told she was " not. She does not follow any special diet. She has not recently had any cold symptoms. She does use zofran every few days due to nausea, which in turn has caused constipation.  At length discussion had with the patient regarding importance of food diary monitoring intake and follow-up with an PCP.  Strict ER precautions also reviewed.       She does have a history of asthma. She does not carry an inhaler with her. However, she had an attack the other day while walking and her friend gave her her albuterol to use which helped. Would like to know if she can be prescribed an inhaler.         Review of Systems   Review of Systems   Constitutional:  Positive for appetite change, chills and fatigue. Negative for fever.   HENT:  Negative for congestion, ear pain, rhinorrhea, sinus pressure, sinus pain, sore throat and trouble swallowing.    Respiratory:  Negative for cough and shortness of breath.    Cardiovascular:  Negative for chest pain and palpitations.   Gastrointestinal:  Positive for abdominal distention, abdominal pain, diarrhea and nausea. Negative for vomiting.   Musculoskeletal:  Negative for arthralgias and back pain.   Skin:  Negative for color change and rash.   Neurological:  Positive for dizziness, light-headedness and headaches.        History of migraines     All other systems reviewed and are negative.        Current Medications       Current Outpatient Medications:     albuterol (ProAir HFA) 90 mcg/act inhaler, Inhale 2 puffs every 6 (six) hours as needed for wheezing, Disp: 8.5 g, Rfl: 0    Apri 0.15-30 MG-MCG per tablet, Take 1 tablet by mouth daily, Disp: , Rfl:     cetirizine (ZyrTEC) 10 mg tablet, Take 10 mg by mouth, Disp: , Rfl:     dicyclomine (BENTYL) 10 mg capsule, Take 1 capsule (10 mg total) by mouth 3 (three) times a day as needed (abdominal cramping), Disp: 10 capsule, Rfl: 0    magnesium Oxide (MAG-OX) 400 mg TABS, Take 400 mg by mouth daily, Disp: , Rfl:     omeprazole (PriLOSEC) 40 MG  capsule, Take 40 mg by mouth daily, Disp: , Rfl:     topiramate (TOPAMAX) 25 mg tablet, PLEASE SEE ATTACHED FOR DETAILED DIRECTIONS, Disp: , Rfl:     traZODone (DESYREL) 50 mg tablet, TAKE 1-2 TABLETS BY MOUTH AT BEDTIME AS NEEDED FOR SLEEP, Disp: , Rfl:     Auvelity  MG TBCR, TAKE ONE TABLET BY MOUTH DAILY FOR 3 DAYS. THEN INCREASE TO ONE TABLET BY MOUTH TWICE DAILY (Patient not taking: Reported on 12/3/2024), Disp: , Rfl:     ondansetron (ZOFRAN-ODT) 8 mg disintegrating tablet, Take one tablet allow to dissolve on tongue up to twice daily as needed for nausea/vomiting (Patient not taking: Reported on 9/4/2024), Disp: , Rfl:     triamcinolone (KENALOG) 0.1 % cream, Apply topically 2 (two) times a day (Patient not taking: Reported on 12/3/2024), Disp: 45 g, Rfl: 1    Current Allergies     Allergies as of 12/03/2024 - Reviewed 12/03/2024   Allergen Reaction Noted    Duloxetine hcl Other (See Comments) 03/20/2020    Sertraline Other (See Comments) 03/20/2020    Lactose intolerance (gi) - food allergy Diarrhea 03/12/2016    Fluoxetine Other (See Comments) 05/15/2020    Gluten meal - food allergy  01/31/2018    Other Hives 09/04/2024            The following portions of the patient's history were reviewed and updated as appropriate: allergies, current medications, past family history, past medical history, past social history, past surgical history and problem list.     Past Medical History:   Diagnosis Date    ADHD (attention deficit hyperactivity disorder)     Anxiety     Autism     Bipolar 1 disorder (HCC)     Constipation     Depression     Inflammatory bowel disease     Obesity     Seasonal allergies     Sinus infection        Past Surgical History:   Procedure Laterality Date    COMBINED REDUCTION MAMMAPLASTY W/ LIPOSUCTION      ESOPHAGOGASTRODUODENOSCOPY      NASAL FRACTURE SURGERY         Family History   Problem Relation Age of Onset    Anxiety disorder Mother     No Known Problems Father   "        Medications have been verified.        Objective   /68   Pulse 98 Comment: apical  Temp 98.6 °F (37 °C)   Resp 18   Ht 5' 5\" (1.651 m)   Wt 136 kg (299 lb)   SpO2 98%   BMI 49.76 kg/m²        Physical Exam     Physical Exam  Vitals and nursing note reviewed.   Constitutional:       General: She is awake. She is not in acute distress.     Appearance: Normal appearance. She is well-developed and well-groomed. She is morbidly obese. She is not ill-appearing.   HENT:      Head: Normocephalic and atraumatic.      Mouth/Throat:      Lips: Pink.      Mouth: Mucous membranes are moist.   Eyes:      Extraocular Movements: Extraocular movements intact.      Conjunctiva/sclera: Conjunctivae normal.      Pupils: Pupils are equal, round, and reactive to light.   Cardiovascular:      Rate and Rhythm: Normal rate and regular rhythm.      Pulses: Normal pulses.      Heart sounds: Normal heart sounds, S1 normal and S2 normal. No murmur heard.  Pulmonary:      Effort: Pulmonary effort is normal.      Breath sounds: Normal breath sounds. No decreased breath sounds, wheezing or rhonchi.   Abdominal:      General: Abdomen is flat. Bowel sounds are normal. There is no distension.      Palpations: Abdomen is soft. There is no shifting dullness or fluid wave.      Tenderness: There is generalized abdominal tenderness.      Hernia: No hernia is present.      Comments: Generalized discomfort without pin-point tenderness.    Musculoskeletal:         General: Normal range of motion.      Cervical back: Full passive range of motion without pain, normal range of motion and neck supple.   Skin:     General: Skin is warm and dry.      Capillary Refill: Capillary refill takes less than 2 seconds.      Findings: No rash.   Neurological:      General: No focal deficit present.      Mental Status: She is alert and oriented to person, place, and time. Mental status is at baseline.      GCS: GCS eye subscore is 4. GCS verbal " subscore is 5. GCS motor subscore is 6.   Psychiatric:         Mood and Affect: Mood normal.         Behavior: Behavior normal. Behavior is cooperative.

## 2024-12-03 NOTE — PATIENT INSTRUCTIONS
You may take over the counter Tylenol (Acetaminophen) and/or Motrin (Ibuprofen) as needed, as directed on packaging. Be sure to get plenty of rest, and drinking fluids to remain hydrated.     Please follow up with your primary provider in the next several days. Should you have any worsening of symptoms, or lack of improvement please be re-evaluated. If needed for significant concerns, consider 911 or ER evaluation.     BRAT Diet  This is a guide to follow when choosing foods during a stomach illness. Eating bland foods like these for 24-48 hours will help get some sustenance in your stomach without likely irritating your stomach. Of course, if you are allergic, or unable to tolerate some of these foods you should avoid them, as this is only a guide. These type foods do not have a high nutrient value but are typically gentle on the stomach.    BRAT stands for Bananas, Rice, Apples/Apple Sauce and Toast.   However, these are not the only foods you can eat while following a BRAT diet. You may also eat: Dry Cereal, Saltine Crackers, Oatmeal, Plain Potatoes, & Broth soups.   Things you should AVOID: Dairy Products, Sugars, Fried or Spicy foods, Alcohol and caffeine.     You should always make sure you are also drinking plenty of fluids including: Water, Broth, Sports drinks, & diluted fruit juice.